# Patient Record
Sex: FEMALE | Race: WHITE | NOT HISPANIC OR LATINO | URBAN - METROPOLITAN AREA
[De-identification: names, ages, dates, MRNs, and addresses within clinical notes are randomized per-mention and may not be internally consistent; named-entity substitution may affect disease eponyms.]

---

## 2022-07-12 ENCOUNTER — EMERGENCY (EMERGENCY)
Facility: HOSPITAL | Age: 67
LOS: 1 days | Discharge: HOME | End: 2022-07-12
Attending: STUDENT IN AN ORGANIZED HEALTH CARE EDUCATION/TRAINING PROGRAM
Payer: SELF-PAY

## 2022-07-12 VITALS
SYSTOLIC BLOOD PRESSURE: 171 MMHG | OXYGEN SATURATION: 98 % | TEMPERATURE: 98 F | RESPIRATION RATE: 20 BRPM | WEIGHT: 104.94 LBS | HEART RATE: 100 BPM | DIASTOLIC BLOOD PRESSURE: 111 MMHG

## 2022-07-12 LAB
BASOPHILS # BLD AUTO: 0.03 K/UL — SIGNIFICANT CHANGE UP (ref 0–0.2)
BASOPHILS NFR BLD AUTO: 0.4 % — SIGNIFICANT CHANGE UP (ref 0–2)
EOSINOPHIL # BLD AUTO: 0 K/UL — SIGNIFICANT CHANGE UP (ref 0–0.5)
EOSINOPHIL NFR BLD AUTO: 0 % — SIGNIFICANT CHANGE UP (ref 0–6)
HCT VFR BLD CALC: 46.6 % — HIGH (ref 34.5–45)
HGB BLD-MCNC: 15.1 G/DL — SIGNIFICANT CHANGE UP (ref 11.5–15.5)
IMM GRANULOCYTES NFR BLD AUTO: 0.1 % — SIGNIFICANT CHANGE UP (ref 0–1.5)
LYMPHOCYTES # BLD AUTO: 2.28 K/UL — SIGNIFICANT CHANGE UP (ref 1–3.3)
LYMPHOCYTES # BLD AUTO: 30.5 % — SIGNIFICANT CHANGE UP (ref 13–44)
MCHC RBC-ENTMCNC: 25.6 PG — LOW (ref 27–34)
MCHC RBC-ENTMCNC: 32.4 GM/DL — SIGNIFICANT CHANGE UP (ref 32–36)
MCV RBC AUTO: 79 FL — LOW (ref 80–100)
MONOCYTES # BLD AUTO: 0.51 K/UL — SIGNIFICANT CHANGE UP (ref 0–0.9)
MONOCYTES NFR BLD AUTO: 6.8 % — SIGNIFICANT CHANGE UP (ref 2–14)
NEUTROPHILS # BLD AUTO: 4.65 K/UL — SIGNIFICANT CHANGE UP (ref 1.8–7.4)
NEUTROPHILS NFR BLD AUTO: 62.2 % — SIGNIFICANT CHANGE UP (ref 43–77)
NRBC # BLD: 0 /100 WBCS — SIGNIFICANT CHANGE UP (ref 0–0)
PLATELET # BLD AUTO: 287 K/UL — SIGNIFICANT CHANGE UP (ref 150–400)
RBC # BLD: 5.9 M/UL — HIGH (ref 3.8–5.2)
RBC # FLD: 13.6 % — SIGNIFICANT CHANGE UP (ref 10.3–14.5)
WBC # BLD: 7.48 K/UL — SIGNIFICANT CHANGE UP (ref 3.8–10.5)
WBC # FLD AUTO: 7.48 K/UL — SIGNIFICANT CHANGE UP (ref 3.8–10.5)

## 2022-07-12 PROCEDURE — 99284 EMERGENCY DEPT VISIT MOD MDM: CPT

## 2022-07-12 RX ORDER — SODIUM CHLORIDE 9 MG/ML
1000 INJECTION INTRAMUSCULAR; INTRAVENOUS; SUBCUTANEOUS ONCE
Refills: 0 | Status: COMPLETED | OUTPATIENT
Start: 2022-07-12 | End: 2022-07-12

## 2022-07-12 RX ADMIN — SODIUM CHLORIDE 1000 MILLILITER(S): 9 INJECTION INTRAMUSCULAR; INTRAVENOUS; SUBCUTANEOUS at 23:58

## 2022-07-12 NOTE — ED PROVIDER NOTE - PATIENT PORTAL LINK FT
You can access the FollowMyHealth Patient Portal offered by [facility] [place] by registering at the following website: [email or web address]. By joining [employer]’s FollowMyHealth portal, you will also be able to view your health information using other applications (apps) compatible with our system.

## 2022-07-12 NOTE — ED PROVIDER NOTE - WET READ LAUNCH FT
There are no Wet Read(s) to document. Diagnosis/Prognosis/Treatment Options Diagnosis/Prognosis/Treatment Options

## 2022-07-12 NOTE — ED PROVIDER NOTE - OBJECTIVE STATEMENT
patient is Mozambican speaking.  used ID [id number]. patient does not want to talk or answer any questions. unable to get in contact with family. son no longer present, went home. as per QPA note, "65 y/o F w/ PMHx asthma brought into ED by son for confusion x1week w/ decreased PO intake. Son reports pt purposely not speaking. [place] house. Recent stressors, death in family. Recently rx'd Remeron. NKDA. Pt in NAD, withdrawn, not engaging in conversation. Primary historian was son".

## 2022-07-12 NOTE — ED PROVIDER NOTE - PROGRESS NOTE DETAILS
labs and imaging reviewed. no acute pathology noted. spoke with psychiatry. pending psych consult at this time  discussed with Dr. Nielsen Yasmin attg: spoke with telepsych, pt not cooperative with interview, fidgeting. will sign out to day time psych team for re-evaluation. if pt becomes agitated recommending 12.5mg seroquel and if IM is needed 2.5 zyprexa. DO Alma Delia: spoke with son Vick, [phone], who stated the patient had a nervous breakdown 1 year prior after her  had her leg amputated.  patient has been more withdrawn according to the son and recently seen at [facility] for similar symptoms and started on mirtazapine.  Vick gave number for brother, Negro, who is the primary contact at [phone]. DO Alma Delia: patient cleared from psych for outpatient follow-up.  Sons updated at length.  patient agreed with decision for discharge.

## 2022-07-12 NOTE — ED PROVIDER NOTE - NS ED ATTENDING STATEMENT MOD
This was a shared visit with the TIMOTHY. I reviewed and verified the documentation and independently performed the documented:

## 2022-07-12 NOTE — ED PROVIDER NOTE - RAPID ASSESSMENT
65 y/o F w/ PMHx asthma brought into ED for confusion x1week w/ decreased PO intake. NKDA. Pt in NAD, withdrawn, not engaging in conversation. Primary historian was son.     IIsamar (Marissa) have documented this rapid assessment note under the dictation of Piotr Mercedes) which has been reviewed and affirmed to be accurate. Patient was seen as a QPA patient. The patient will be seen and further worked up in the main emergency department and their care will be completed by the main emergency department team along with a thorough physical exam. Receiving team will follow up on labs, analgesia, any clinical imaging, reassess and disposition as clinically indicated, all decisions regarding the progression of care will be made at their discretion. 65 y/o F w/ PMHx asthma brought into ED by son for confusion x1week w/ decreased PO intake. Son reports pt purposely not speaking. [place] house. Recent stressors, death in family. Recently rx'd Remeron. NKDA. Pt in NAD, withdrawn, not engaging in conversation. Primary historian was son.     I, Isamar Sanchez (Scribe) have documented this rapid assessment note under the dictation of Piotr Mercedes (PA) which has been reviewed and affirmed to be accurate. Patient was seen as a QPA patient. The patient will be seen and further worked up in the main emergency department and their care will be completed by the main emergency department team along with a thorough physical exam. Receiving team will follow up on labs, analgesia, any clinical imaging, reassess and disposition as clinically indicated, all decisions regarding the progression of care will be made at their discretion.    Piotr Mercedes (PA) note: This scribe's documentation has been prepared under my direction and personally reviewed by me. The patient will be seen and further worked up in the main emergency department and their care will be completed by the main emergency department team along with a thorough physical exam. Receiving team will follow up on labs, analgesia, any clinical imaging, reassess and disposition as clinically indicated, all decisions regarding the progression of care will be made at their discretion.

## 2022-07-12 NOTE — ED PROVIDER NOTE - CLINICAL SUMMARY MEDICAL DECISION MAKING FREE TEXT BOX
67 y/o F w/ PMHx asthma brought into ED by son for confusion x1week w/ decreased PO intake.  Patient received labs and CTH.  Testing was negative for acute pathology.  Psychiatry was consulted and cleared patient for outpatient follow-up.  Patient and patient's sons agreed with decision for discharge.

## 2022-07-12 NOTE — ED PROVIDER NOTE - PHYSICAL EXAMINATION
NAD. resting comfortably in stretcher   cardio: normal s1,s2. RRR  pulm: CTABL  abd: soft, ndnttp  neuro: unable to assess as patient will not cooperate and answer questions   MSK: moving all extremities freely

## 2022-07-12 NOTE — ED PROVIDER NOTE - NSFOLLOWUPINSTRUCTIONS_ED_ALL_ED_FT
Please follow-up with your psychiatrist on an outpatient basis.  Return to the Emergency Department if you have new or worsening symptoms.

## 2022-07-12 NOTE — ED PROVIDER NOTE - ATTENDING APP SHARED VISIT CONTRIBUTION OF CARE
I, Rocio Hoffman, performed a history and physical exam of the patient and discussed their management with the resident and /or advanced care provider. I reviewed the resident and /or ACP's note and agree with the documented findings and plan of care. I was present and available for all procedures.     67yo F brought to the ED by her son due to decreased appetite, refusing to speak and pacing in the house. son not present at time pt was brought back to main ED, no phone number available in the chart. pt English speaking, refusing to speak with  present, or with English speaking team members. pt withdrawn, not making eye contact, laying on her side. in no acute distress. non-labored breathing, lungs CTAB, saturating well, abd soft, NDNT, moving all 4 extremities. will obtain labs, CTH, UA/UC to assess for possible infectious etiology, psych clearance labs and psych eval.

## 2022-07-13 VITALS
SYSTOLIC BLOOD PRESSURE: 130 MMHG | RESPIRATION RATE: 16 BRPM | TEMPERATURE: 98 F | DIASTOLIC BLOOD PRESSURE: 75 MMHG | OXYGEN SATURATION: 99 % | HEART RATE: 62 BPM

## 2022-07-13 DIAGNOSIS — F39 UNSPECIFIED MOOD [AFFECTIVE] DISORDER: ICD-10-CM

## 2022-07-13 LAB
ALBUMIN SERPL ELPH-MCNC: 4.7 G/DL — SIGNIFICANT CHANGE UP (ref 3.3–5)
ALP SERPL-CCNC: 86 U/L — SIGNIFICANT CHANGE UP (ref 40–120)
ALT FLD-CCNC: 39 U/L — SIGNIFICANT CHANGE UP (ref 10–45)
ANION GAP SERPL CALC-SCNC: 17 MMOL/L — SIGNIFICANT CHANGE UP (ref 5–17)
APAP SERPL-MCNC: <15 UG/ML — SIGNIFICANT CHANGE UP (ref 10–30)
APPEARANCE UR: CLEAR — SIGNIFICANT CHANGE UP
AST SERPL-CCNC: 35 U/L — SIGNIFICANT CHANGE UP (ref 10–40)
BACTERIA # UR AUTO: NEGATIVE — SIGNIFICANT CHANGE UP
BILIRUB SERPL-MCNC: 0.4 MG/DL — SIGNIFICANT CHANGE UP (ref 0.2–1.2)
BILIRUB UR-MCNC: NEGATIVE — SIGNIFICANT CHANGE UP
BUN SERPL-MCNC: 12 MG/DL — SIGNIFICANT CHANGE UP (ref 7–23)
CALCIUM SERPL-MCNC: 9.9 MG/DL — SIGNIFICANT CHANGE UP (ref 8.4–10.5)
CHLORIDE SERPL-SCNC: 103 MMOL/L — SIGNIFICANT CHANGE UP (ref 96–108)
CO2 SERPL-SCNC: 22 MMOL/L — SIGNIFICANT CHANGE UP (ref 22–31)
COLOR SPEC: YELLOW — SIGNIFICANT CHANGE UP
CREAT SERPL-MCNC: 0.63 MG/DL — SIGNIFICANT CHANGE UP (ref 0.5–1.3)
DIFF PNL FLD: NEGATIVE — SIGNIFICANT CHANGE UP
EGFR: 98 ML/MIN/1.73M2 — SIGNIFICANT CHANGE UP
EPI CELLS # UR: 3 /HPF — SIGNIFICANT CHANGE UP
ETHANOL SERPL-MCNC: <10 MG/DL — SIGNIFICANT CHANGE UP (ref 0–10)
GLUCOSE SERPL-MCNC: 125 MG/DL — HIGH (ref 70–99)
GLUCOSE UR QL: NEGATIVE — SIGNIFICANT CHANGE UP
HYALINE CASTS # UR AUTO: 1 /LPF — SIGNIFICANT CHANGE UP (ref 0–2)
KETONES UR-MCNC: ABNORMAL
LEUKOCYTE ESTERASE UR-ACNC: NEGATIVE — SIGNIFICANT CHANGE UP
NITRITE UR-MCNC: NEGATIVE — SIGNIFICANT CHANGE UP
PH UR: 7 — SIGNIFICANT CHANGE UP (ref 5–8)
POTASSIUM SERPL-MCNC: 4.5 MMOL/L — SIGNIFICANT CHANGE UP (ref 3.5–5.3)
POTASSIUM SERPL-SCNC: 4.5 MMOL/L — SIGNIFICANT CHANGE UP (ref 3.5–5.3)
PROT SERPL-MCNC: 7.6 G/DL — SIGNIFICANT CHANGE UP (ref 6–8.3)
PROT UR-MCNC: SIGNIFICANT CHANGE UP
RBC CASTS # UR COMP ASSIST: 0 /HPF — SIGNIFICANT CHANGE UP (ref 0–4)
SALICYLATES SERPL-MCNC: <2 MG/DL — LOW (ref 15–30)
SARS-COV-2 RNA SPEC QL NAA+PROBE: SIGNIFICANT CHANGE UP
SODIUM SERPL-SCNC: 142 MMOL/L — SIGNIFICANT CHANGE UP (ref 135–145)
SP GR SPEC: 1.02 — SIGNIFICANT CHANGE UP (ref 1.01–1.02)
TSH SERPL-MCNC: 1.84 UIU/ML — SIGNIFICANT CHANGE UP (ref 0.27–4.2)
UROBILINOGEN FLD QL: NEGATIVE — SIGNIFICANT CHANGE UP
WBC UR QL: 2 /HPF — SIGNIFICANT CHANGE UP (ref 0–5)

## 2022-07-13 PROCEDURE — 87086 URINE CULTURE/COLONY COUNT: CPT

## 2022-07-13 PROCEDURE — U0003: CPT

## 2022-07-13 PROCEDURE — 81001 URINALYSIS AUTO W/SCOPE: CPT

## 2022-07-13 PROCEDURE — 70450 CT HEAD/BRAIN W/O DYE: CPT | Mod: MA

## 2022-07-13 PROCEDURE — 80307 DRUG TEST PRSMV CHEM ANLYZR: CPT

## 2022-07-13 PROCEDURE — 84443 ASSAY THYROID STIM HORMONE: CPT

## 2022-07-13 PROCEDURE — 80053 COMPREHEN METABOLIC PANEL: CPT

## 2022-07-13 PROCEDURE — 36415 COLL VENOUS BLD VENIPUNCTURE: CPT

## 2022-07-13 PROCEDURE — U0005: CPT

## 2022-07-13 PROCEDURE — 85025 COMPLETE CBC W/AUTO DIFF WBC: CPT

## 2022-07-13 PROCEDURE — 90792 PSYCH DIAG EVAL W/MED SRVCS: CPT | Mod: 95

## 2022-07-13 PROCEDURE — 82962 GLUCOSE BLOOD TEST: CPT

## 2022-07-13 PROCEDURE — 71045 X-RAY EXAM CHEST 1 VIEW: CPT

## 2022-07-13 PROCEDURE — 71045 X-RAY EXAM CHEST 1 VIEW: CPT | Mod: 26

## 2022-07-13 PROCEDURE — 99285 EMERGENCY DEPT VISIT HI MDM: CPT | Mod: 25

## 2022-07-13 PROCEDURE — 70450 CT HEAD/BRAIN W/O DYE: CPT | Mod: 26,MA

## 2022-07-13 NOTE — ED BEHAVIORAL HEALTH ASSESSMENT NOTE - DESCRIPTION
Per staff patient has remained mute and has not provided staff or medical team with further information. She has remained on 1:1. Asthma Unclear at this time

## 2022-07-13 NOTE — ED ADULT NURSE NOTE - NSIMPLEMENTINTERV_GEN_ALL_ED
Implemented All Fall Risk Interventions:  [place] to call system. Call bell, personal items and telephone within reach. Instruct patient to call for assistance. Room bathroom lighting operational. Non-slip footwear when patient is off stretcher. Physically safe environment: no spills, clutter or unnecessary equipment. Stretcher in lowest position, wheels locked, appropriate side rails in place. Provide visual cue, wrist band, yellow gown, etc. Monitor gait and stability. Monitor for mental status changes and reorient to person, place, and time. Review medications for side effects contributing to fall risk. Reinforce activity limits and safety measures with patient and family.

## 2022-07-13 NOTE — ED BEHAVIORAL HEALTH ASSESSMENT NOTE - DETAILS
Patient not talking Dr. Hoffman Son left ED before this writer could speak with him Unable to assess

## 2022-07-13 NOTE — ED BEHAVIORAL HEALTH ASSESSMENT NOTE - HPI (INCLUDE ILLNESS QUALITY, SEVERITY, DURATION, TIMING, CONTEXT, MODIFYING FACTORS, ASSOCIATED SIGNS AND SYMPTOMS)
Patient is a 65 yo Azerbaijani-speaking female, has an adult son, with unclear psychiatric history, but recently prescribed Remeron (dose unknown), with PMH significant for asthma, who was BIB son for recent change in mental status.     Per chart, patient's son informed the team that the patient Patient is a 67 yo Tajik-speaking female, has an adult son, with unclear psychiatric history, but recently prescribed Remeron (dose unknown), with PMH significant for asthma, who was BIB son for recent change in mental status.     Per chart, patient's son informed the team that starting a week ago the patient became nonverbal, was pacing around her home, and had poor PO intake. This occurred in the setting of a recent death in the family. The son brought the patient into the ED tonight for an evaluation, but subsequently left and did not leave a phone number he could be contacted at.     On assessment, the patient presented as restless, was observed repeatedly trying to get off her stretcher, needed frequent re-direction by staff, and did not answer any of this writer's questions. Given that the son did not leave his contact information before leaving and that the home phone listed in the chart is actually the phone number for [facility], this writer was unable to obtain collateral.

## 2022-07-13 NOTE — ED ADULT NURSE REASSESSMENT NOTE - NS ED NURSE REASSESS COMMENT FT1
pt to speak with telepsych. Pt in hospital gown and socks, wanded by security. Belongings secured with security. paperwork in chart. Pt did not have valuables with her. 1:1 initiated and at bedside for pt safety. pt to speak with telepsych. Pt fully undressed in hospital gown and socks, wanded by security. Belongings secured with security. paperwork in chart. Pt did not have valuables with her. Pt moved from hallway spot in front on nurses' station to room, all wires and equipment that pose as threat to patient removed from room. 1:1 initiated and at bedside for pt safety.

## 2022-07-13 NOTE — ED BEHAVIORAL HEALTH NOTE - BEHAVIORAL HEALTH NOTE
===================       PRE-HOSPITAL COURSE       ===================       SOURCE:  Son (Negro) and secondhand EMR documentation.        DETAILS:      66F with no significant past psychiatric history, no hx of suicidal or homicidal ideations/attempt/intent/plan, past medical history of asthma BIB son to ED due to acute onset of altered mental status, anxiety, and depressed mood for 1-2 week duration.     Spoke with son, Negro ( [phone]). Son reports patient became worried and anxious with panic attacks starting 1-2 weeks ago. He reports patient has several recent stressors including the death of her brother 3 weeks ago and diabetic  with leg amputation 2 months ago. He states 3 weeks ago patient began complaining of difficulty sleeping (approximately 4-5 hours per night). 1-1.5 weeks later, the patient became anxious with frequent episodes of panic regarding her 's health and their insurance. He states patients mood has been depressed and anxious with decreased concentration, appetite, feelings of guilt, and low energy. Denies suicidal ideations/intent/plan. Denies homicidal ideations. Son reports patient was seen at [facility] on Saturday 7/9 where she was diagnosed with depression and prescribed Remeron 7.5mg qhs. He reports giving the medication to the patient for 2 nights, but stopped due to "not liking the effects the medication had on mother". He described the patient as "out of it" and a "zombie" on the medication. For the last 2 days, son reports patient has been talking in the morning, but gets overwhelmed/tired and stopped talking in the evening. Yesterday, he reports the patient did not want to leave the house. He states after convincing the patient to go on a walk, she became anxious, scared, and attempted to hide from others. Denies hx of stroke or family hx of dementia. Denies known psychiatric history, family psychiatric history, or substance use. Per chart, utx +THC. States patient has been carrying out ADLs on her own. Son denies concern that patient is a risk to self or others.     ============       ED COURSE:       ============       SOURCE:  Secondhand EMR documentation.        ARRIVAL:  Patient was cooperative with hospital protocol and allowed for gowning/wanding without incident. Patient presents with good hygiene/grooming. Patient placed on 1:1 In private room for consult.        BELONGINGS:  None notable.       BEHAVIOR: Blood/urine provided for routine labs without noted incident. Patient remains nonverbal while in ED and is not answering assessment questions. . Patient remains resting in hospital bed.     TREATMENT: Patient did not require medication intervention while in ED.     VISITORS:  Patient presently unaccompanied by social supports while in ED. ===================       PRE-HOSPITAL COURSE       ===================       SOURCE:  Son (Negro) and secondhand EMR documentation.        DETAILS:      66F with no significant past psychiatric history, no hx of suicidal or homicidal ideations/attempt/intent/plan, past medical history of asthma BIB son to ED due to acute onset of altered mental status, anxiety, and depressed mood for 1-2 week duration.     Spoke with son, Negro ( [phone]). Son reports patient became worried and anxious with panic attacks starting 1-2 weeks ago. He reports patient has several recent stressors including the death of her brother 3 weeks ago and diabetic  with leg amputation 2 months ago. He states 3 weeks ago patient began complaining of difficulty sleeping (approximately 4-5 hours per night). 1-1.5 weeks later, the patient became anxious with frequent episodes of panic regarding her 's health and their insurance. He states patients mood has been depressed and anxious with decreased concentration, appetite, feelings of guilt, and low energy. Denies suicidal ideations/intent/plan. Denies homicidal ideations. Son reports patient was seen at [facility] on Saturday 7/9 where she was diagnosed with depression and prescribed Remeron 7.5mg qhs. He reports giving the medication to the patient for 2 nights, but stopped due to "not liking the effects the medication had on mother". He described the patient as "out of it" and a "zombie" on the medication. For the last 2 days, son reports patient has been talking in the morning, but gets overwhelmed/tired and stopped talking in the evening. Yesterday, he reports the patient did not want to leave the house. He states after convincing the patient to go on a walk, she became anxious, scared, and attempted to hide from others. Denies hx of stroke or family hx of dementia. Denies known psychiatric history, family psychiatric history, or substance use. Per chart, utx +THC. States patient has been carrying out ADLs on her own. Son denies concern that patient is a risk to self or others.     ============       ED COURSE:       ============       SOURCE:  Secondhand EMR documentation.        ARRIVAL:  Patient was cooperative with hospital protocol and allowed for gowning/wanding without incident. Patient presents with good hygiene/grooming. Patient placed on 1:1 In private room for consult.        BELONGINGS:  None notable.       BEHAVIOR: Blood/urine provided for routine labs without noted incident. Patient remains nonverbal while in ED and is not answering assessment questions. . Patient remains resting in hospital bed.     TREATMENT: Patient did not require medication intervention while in ED.     VISITORS:  Patient presently unaccompanied by social supports while in ED.      attending note- Agree with above assessement, plan. Pt answering few questions, saying "no" to most questions. no safety concerns from family, no si/hi expressed. rec f/u [facility] clinic, pt can be d/c home.

## 2022-07-13 NOTE — ED BEHAVIORAL HEALTH ASSESSMENT NOTE - RISK ASSESSMENT
Patient presents with multiple risk factors that elevate her risk of harm including recent change in mental status, recent decreased PO intake, and recent personal loss. Her protective factors include support from son. At this time, it is difficult to discern her level of imminent risk of harm and the need for psychiatric hospitalization until further collateral is obtained. Unable to determine Suicide Risk

## 2022-07-13 NOTE — ED BEHAVIORAL HEALTH NOTE - BEHAVIORAL HEALTH NOTE
===================       PRE-HOSPITAL COURSE       ===================       SOURCE:  Secondhand EMR documentation.        DETAILS:  Patient brought in by son; chief complaint of AMS/not talking.     ============       ED COURSE:       ============       SOURCE:  RN and secondhand EMR documentation.        ARRIVAL:  Patient was cooperative with hospital protocol and allowed for gowning/wanding without incident. Patient presents with good hygiene/grooming. Patient placed on 1:1 In private room for consult.        BELONGINGS:  None notable.       BEHAVIOR: Blood/urine provided for routine labs without noted incident. Patient remains nonverbal while in ED and is not answering assessment questions. Patient did motion to RN to ask for assistance to use restroom, does make eye contact. Patient remains resting in hospital bed.     TREATMENT: Patient did not require medication intervention while in ED.     VISITORS:  Patient presently unaccompanied by social supports while in ED.  Patient's son reportedly left; no contact information recorded.            COVID Exposure Screen- collateral (i.e. third-party, chart review, belongings, etc; include EMS and ED staff)      1. *Has the patient been tested for COVID-19 in the last 90 days? (  ) Yes ( ) No (X) Unknown- Reason: _____       IF YES PROCEED TO QUESTION #2. IF NO OR UNKNOWN, PLEASE SKIP TO QUESTION #3.       2. Date of test(s), type of test(s), result(s) for ALL tests in last 90 days: ________      3. *Has the patient received a COVID-19 vaccine? (  ) Yes ( ) No (X) Unknown- Reason: _____       IF YES PROCEED TO QUESTION #4. IF NO or UNKNOWN, PLEASE SKIP TO QUESTION #7.       4. Moderna ( ) Pfizer ( ) J&J ( )        5. Number of doses: ________       6. Date of last dose: ________       7. *In the past 10 days, has the patient been around anyone with a positive COVID-19 test?* ( ) Yes ( ) No (X) Unknown- Reason: ____       IF YES PLEASE ANSWER THE FOLLOWING QUESTIONS:       8. Was the patient within 6 feet of them for at least 15 minutes? ( ) Yes ( ) No ( ) Unknown- Reason: _____       9. Did the patient provide care for them? ( ) Yes ( ) No ( ) Unknown- Reason: ______       10. Did the patient have direct physical contact with them (touched, hugged, or kissed them)? ( ) Yes ( ) No ( ) Unknown- Reason: __       11. Did the patient share eating or drinking utensils with them? ( ) Yes ( ) No ( ) Unknown- Reason: ____       12. Did they sneeze, cough, or somehow get respiratory droplets on the patient? ( ) Yes ( ) No ( ) Unknown- Reason: ______

## 2022-07-13 NOTE — ED ADULT NURSE NOTE - OBJECTIVE STATEMENT
67 yo female with pmh asthma presents to ED for confusion x 1 week. 65 yo female with pmh asthma presents to ED for confusion x 1 week. Attempted to use  [id number], pt poor historian. Upon assessment, pt nonverbal, breathing spontaneous and unlabored, moving all extremities spontaneously, not answering questions, following minimal commands, skin warm and appropriate color, VSS. Pt placed on CM. MD to contact family for further information. Pt safety measures in place and comfort provided. 67 yo female with pmh asthma presents to ED for confusion x 1 week. Attempted to use  [id number], pt poor historian. Upon assessment, pt alter but not speaking, breathing spontaneous and unlabored, moving all extremities spontaneously, not answering questions, following minimal commands, skin warm and appropriate color, VSS. Pt placed on CM. MD to contact family for further information. Pt safety measures in place and comfort provided.

## 2022-07-13 NOTE — ED BEHAVIORAL HEALTH ASSESSMENT NOTE - SUMMARY
Patient is a 67 yo Khmer-speaking female, has an adult son, with unclear psychiatric history, but recently prescribed Remeron (dose unknown), with PMH significant for asthma, who was BIB son for recent change in mental status.     Per chart, patient's son informed the team that starting a week ago the patient became nonverbal, was pacing around her home, and had poor PO intake. This occurred in the setting of a recent death in the family. The son brought the patient into the ED tonight for an evaluation, but subsequently left and did not leave a phone number he could be contacted at.     On assessment, the patient presented as restless, was observed repeatedly trying to get off her stretcher, needed frequent re-direction by staff, and did not answer any of this writer's questions. Given that the son did not leave his contact information and that the home phone listed in the chart is actually the phone number for [facility], this writer was unable to obtain collateral.    Given the limited history, it is difficult to determine the patients' level of risk of harm without further collateral. Will recommend holding patient in the ED for further collateral and reassessment.

## 2022-07-13 NOTE — ED BEHAVIORAL HEALTH ASSESSMENT NOTE - PSYCHIATRIC ISSUES AND PLAN (INCLUDE STANDING AND PRN MEDICATION)
Contact patient's son to confirm outpatient medication regimen Contact patient's son to confirm dose of Remeron. For episodes of agitation in which patient is in acute risk fo harm to self/others, can offer PO start quetiapine extended release 50 mg daily x1 day; increase to 100 mg daily on day 2, further increase by 100 mg daily until 300 mg daily is reached by day 4 (target dose: 300 mg/d) 12.5 mg Q6H. If refusing PO, can offer IM Zyprexa 2.5 mg Q6H. If given, please repeat ecg and hold antipsychotics if QTC>500. Maintain on 1:1 for safety.

## 2022-07-13 NOTE — ED BEHAVIORAL HEALTH ASSESSMENT NOTE - OTHER
Son brought patient in Unable to assess Unable to assess reported mood Unknown where patient lives Unknown

## 2022-07-14 LAB
CULTURE RESULTS: SIGNIFICANT CHANGE UP
SPECIMEN SOURCE: SIGNIFICANT CHANGE UP

## 2022-09-12 NOTE — ED BEHAVIORAL HEALTH ASSESSMENT NOTE - NS ED BHA TELEPSYCH PATIENT LOCATION
Yann Brandon from you was 8/5/22. Willing to refill until she see  [facility] CONT[facility]UING MED CARE [place] on 9/29/22?
[facility]

## 2025-01-27 NOTE — ED BEHAVIORAL HEALTH ASSESSMENT NOTE - NSBHATTESTBILLING_PSY_A_CORE
Please read attached instructions in the back of this packet and return or go to the emergency room for items listed or as we discussed or to your discretion    I recommend taking all medications as prescribed/discussed    Please follow-up with your primary doctor as we discussed or as needed or for persisting symptoms    
[id number] diagnostic evaluation with medical services